# Patient Record
Sex: MALE | Race: WHITE | NOT HISPANIC OR LATINO | URBAN - METROPOLITAN AREA
[De-identification: names, ages, dates, MRNs, and addresses within clinical notes are randomized per-mention and may not be internally consistent; named-entity substitution may affect disease eponyms.]

---

## 2017-01-09 ENCOUNTER — OFFICE VISIT (OUTPATIENT)
Dept: FAMILY MEDICINE | Facility: CLINIC | Age: 62
End: 2017-01-09

## 2017-01-09 VITALS
OXYGEN SATURATION: 99 % | WEIGHT: 149.4 LBS | DIASTOLIC BLOOD PRESSURE: 77 MMHG | HEART RATE: 56 BPM | TEMPERATURE: 98.1 F | SYSTOLIC BLOOD PRESSURE: 122 MMHG | BODY MASS INDEX: 25.63 KG/M2

## 2017-01-09 DIAGNOSIS — D50.0 IRON DEFICIENCY ANEMIA DUE TO CHRONIC BLOOD LOSS: Primary | ICD-10-CM

## 2017-01-09 DIAGNOSIS — F41.9 ANXIETY: ICD-10-CM

## 2017-01-09 DIAGNOSIS — R09.89 RUNNY NOSE: ICD-10-CM

## 2017-01-09 DIAGNOSIS — G47.00 INSOMNIA, UNSPECIFIED TYPE: ICD-10-CM

## 2017-01-09 DIAGNOSIS — J30.9 ALLERGIC RHINITIS, UNSPECIFIED ALLERGIC RHINITIS TRIGGER, UNSPECIFIED RHINITIS SEASONALITY: ICD-10-CM

## 2017-01-09 LAB
% GRANULOCYTES: 69.4 %G (ref 40–75)
GRANULOCYTES #: 4.5 K/UL (ref 1.6–8.3)
HCT VFR BLD AUTO: 37.7 % (ref 40–53)
HEMOGLOBIN: 11.4 G/DL (ref 13.3–17.7)
INR PPP: 2
LYMPHOCYTES # BLD AUTO: 1.5 K/UL (ref 0.8–5.3)
LYMPHOCYTES NFR BLD AUTO: 23.5 %L (ref 20–48)
MCH RBC QN AUTO: 25.9 PG (ref 26.5–35)
MCHC RBC AUTO-ENTMCNC: 30.2 G/DL (ref 32–36)
MCV RBC AUTO: 85.6 FL (ref 78–100)
MID #: 0.5 K/UL (ref 0–2.2)
MID %: 7.1 %M (ref 0–20)
PLATELET # BLD AUTO: 350 K/UL (ref 150–450)
RBC # BLD AUTO: 4.4 M/UL (ref 4.4–5.9)
WBC # BLD AUTO: 6.5 K/UL (ref 4–11)

## 2017-01-09 RX ORDER — FLUTICASONE PROPIONATE 50 MCG
1 SPRAY, SUSPENSION (ML) NASAL DAILY
Qty: 1 BOTTLE | Refills: 11 | Status: SHIPPED | OUTPATIENT
Start: 2017-01-09

## 2017-01-09 RX ORDER — GABAPENTIN 400 MG/1
CAPSULE ORAL
Qty: 0.1 CAPSULE | Refills: 0 | COMMUNITY
Start: 2017-01-09

## 2017-01-09 RX ORDER — OXYMETAZOLINE HYDROCHLORIDE 0.05 G/100ML
1 SPRAY NASAL 2 TIMES DAILY
Qty: 15 ML | Refills: 0 | Status: SHIPPED | OUTPATIENT
Start: 2017-01-09 | End: 2017-01-14

## 2017-01-09 RX ORDER — TRAZODONE HYDROCHLORIDE 100 MG/1
TABLET ORAL
Qty: 0.1 TABLET | Refills: 0 | COMMUNITY
Start: 2017-01-09

## 2017-01-09 NOTE — Clinical Note
January 9, 2017      Brayan Jackson  [address]  [place] [address]        Dear Brayan,    Please see below for your test results.  Jamshid Schmidt - your hemoglobin has improved a lot but is still below normal.  I would go ahead and take one iron pill twice a day for one more month and then can cut back to once a day.  We'll check your hemoglobin again in about a month - and will expect it to be closer to normal then, take care,      Resulted Orders   CBC with Diff Plt ([facility])   Result Value Ref Range    WBC 6.5 4.0 - 11.0 K/uL    Lymphocytes # 1.5 0.8 - 5.3 K/uL    % Lymphocytes 23.5 20.0 - 48.0 %L    Mid # 0.5 0.0 - 2.2 K/uL    Mid % 7.1 0.0 - 20.0 %M    GRANULOCYTES # 4.5 1.6 - 8.3 K/uL    % Granulocytes 69.4 40.0 - 75.0 %G    RBC 4.4 4.4 - 5.9 M/uL    Hemoglobin 11.4 (L) 13.3 - 17.7 g/dL    Hematocrit 37.7 (L) 40.0 - 53.0 %    MCV 85.6 78.0 - 100.0 fL    MCH 25.9 (L) 26.5 - 35.0    MCHC 30.2 (L) 32.0 - 36.0 g/dL    Platelets 350.0 150.0 - 450.0 K/uL       If you have any questions, please call the clinic to make an appointment.    Sincerely,    Suresh Winn MD

## 2017-01-09 NOTE — MR AVS SNAPSHOT
After Visit Summary   1/9/2017    Brayan Jackson    MRN: [id number]           Patient Information     Date Of Birth          1955        Visit Information        Provider Department      1/9/2017 10:00 AM Suresh Winn MD [facility]        Today's Diagnoses     Iron deficiency anemia due to chronic blood loss    -  1     Runny nose         Allergic rhinitis, unspecified allergic rhinitis trigger, unspecified rhinitis seasonality           Care Instructions    Diagnosis PULM EMBOLISM    INR Goal 2.0-3.0    Last Weeks Dose 3.5mg daily    Patient Instructions: Continue 3.5mg daily    Recheck INR In: 2 WEEKS    INR assessment THER    INR Location Clinic    Comments Not using pill box. Provided to pt.      Take oxymetazoline (afrin) nose spray for 5 days then stop.    Start fluticasone nose spray every day after 5 days and continue it to prevent nose running.          Follow-ups after your visit        Who to contact     Please call your clinic at [phone] to:    Ask questions about your health    Make or cancel appointments    Discuss your medicines    Learn about your test results    Speak to your doctor   If you have compliments or concerns about an experience at your clinic, or if you wish to file a complaint, please contact [facility] Patient Relations at [phone] or email us at [email or web address]         Additional Information About Your Visit        [email or web address] Information     EdCourage is an electronic gateway that provides easy, online access to your medical records. With EdCourage, you can request a clinic appointment, read your test results, renew a prescription or communicate with your care team.     To sign up for [email or web address] visit the website at [email or web address]   You will be asked to enter the access code listed below, as well as some personal information. Please follow the directions to create your username and password.     Your  access code is: [email or web address]  Expires: 2017 10:28 AM     Your access code will  in 90 days. If you need help or a new code, please contact your [facility] or call [phone] for assistance.        Care EveryWhere ID     This is your Care EveryWhere ID. This could be used by other organizations to access your [place] medical records  [id number]        Your Vitals Were     Pulse Temperature Pulse Oximetry             56 98.1  F (36.7  C) (Oral) 99%          Blood Pressure from Last 3 Encounters:   17 122/77   16 94/60   16 96/62    Weight from Last 3 Encounters:   17 149 lb 6.4 oz (67.767 kg)   16 154 lb 9.6 oz (70.126 kg)   16 142 lb (64.411 kg)              We Performed the Following     CBC with Diff Plt ([facility])     INR ([facility])          Today's Medication Changes          These changes are accurate as of: 17 10:28 AM.  If you have any questions, ask your nurse or doctor.               Start taking these medicines.        Dose/Directions    fluticasone 50 MCG/ACT spray   Commonly known as:  FLONASE   Used for:  Allergic rhinitis, unspecified allergic rhinitis trigger, unspecified rhinitis seasonality   Started by:  Suresh Winn MD        Dose:  1 spray   Spray 1 spray into both nostrils daily   Quantity:  1 Bottle   Refills:  11       oxymetazoline 0.05 % spray   Commonly known as:  AFRIN NASAL SPRAY   Used for:  Runny nose   Started by:  Suresh Winn MD        Dose:  1 spray   Spray 1 spray into both nostrils 2 times daily for 5 days   Quantity:  15 mL   Refills:  0         Stop taking these medicines if you haven't already. Please contact your care team if you have questions.     hydrOXYzine 50 MG capsule   Commonly known as:  VISTARIL   Stopped by:  Suresh Winn MD                Where to get your medicines      Some of these will need a paper prescription and others can be bought over the counter.  Ask your nurse if you  have questions.     Bring a paper prescription for each of these medications    - fluticasone 50 MCG/ACT spray  - oxymetazoline 0.05 % spray             Primary Care Provider Office Phone # Fax #    Suresh Winn [phone]       [address] [address]        Thank you!     Thank you for choosing [facility]  for your care. Our goal is always to provide you with excellent care. Hearing back from our patients is one way we can continue to improve our services. Please take a few minutes to complete the written survey that you may receive in the mail after your visit with us. Thank you!             Your Updated Medication List - Protect others around you: Learn how to safely use, store and throw away your medicines at www.disposemymeds.org.          This list is accurate as of: 1/9/17 10:28 AM.  Always use your most recent med list.                   Brand Name Dispense Instructions for use    B-12 1000 MCG Tbcr     30 tablet    Take 1,000 mcg by mouth daily       busPIRone 5 MG tablet    BUSPAR    90 tablet    Take 1 tablet (5 mg) by mouth 3 times daily       ferrous fumarate 324 (106 FE) MG Tabs tablet    FERRETTS    60 each    Take 1 tablet (324 mg) by mouth daily If tolerated, increase to twice daily after 1 week       ferrous gluconate 324 (38 FE) MG tablet    FERGON    100 tablet    Take 1 tablet (324 mg) by mouth 2 times daily       ferrous sulfate 325 (65 FE) MG tablet    IRON    90 tablet    Take 1 tablet (325 mg) by mouth 3 times daily (with meals)       fluticasone 50 MCG/ACT spray    FLONASE    1 Bottle    Spray 1 spray into both nostrils daily       gabapentin 400 MG capsule    NEURONTIN    0.1 capsule    Please ask Dr Alonso to refill this to avoid mistakes       LORazepam 1 MG tablet    ATIVAN    20 tablet    Take 1 tablet (1 mg) by mouth 3 times daily as needed for anxiety       oxymetazoline 0.05 % spray    AFRIN NASAL SPRAY    15 mL    Spray 1 spray  into both nostrils 2 times daily for 5 days       risperiDONE 4 MG tablet    risperDAL     Take 1 tablet (4 mg) by mouth At Bedtime       traZODone 100 MG tablet    DESYREL    0.1 tablet    Please ask Dr Alonso to refill this to avoid mistakes       * warfarin 1 MG tablet    COUMADIN    30 tablet    Take 1 tablet (1 mg) by mouth daily With 2.5mg tablet for total dose of 3.5mg       * warfarin 2.5 MG tablet    COUMADIN    30 tablet    Take 1 tablet (2.5 mg) by mouth daily With 1mg tablet for total dose of 3.5mg/day       * Notice:  This list has 2 medication(s) that are the same as other medications prescribed for you. Read the directions carefully, and ask your doctor or other care provider to review them with you.

## 2017-01-09 NOTE — PATIENT INSTRUCTIONS
Diagnosis PULM EMBOLISM    INR Goal 2.0-3.0    Last Weeks Dose 3.5mg daily    Patient Instructions: Continue 3.5mg daily    Recheck INR In: 2 WEEKS    INR assessment THER    INR Location Clinic    Comments Not using pill box. Provided to pt.      Take oxymetazoline (afrin) nose spray for 5 days then stop.    Start fluticasone nose spray every day after 5 days and continue it to prevent nose running.

## 2017-01-09 NOTE — PROGRESS NOTES
SUBJECTIVE:  Brayan is a 61-year-old who is well-known to me.  He attends with one-month history of runny nose.  He seems distressed by this.  He says that it occurs particularly when he is eating and is frustrating for that reason.  He doesn't notice it so much when he is in bed or at other times.  He acknowledges that cold weather may worsen it.  He doesn't believe he has other allergies.  I noted an asthma diagnosis on file, but he says that he hasn't had any problems with this for years and he hasn't used an inhaler for a long time; therefore, we agreed to remove this from his problem list.  His ACT score today confirms that he has no asthma symptoms.   Otherwise, he feels that he is doing well.  He lives independently.  He attends a psychiatrist and he  can't say for sure all of his current medications.  He believes he is emotionally stable at this time and is managing things well.   OBJECTIVE:   Vital signs are noted and are satisfactory.  Please see the attached note by pharmacy that reviews his INR.  They are also attempting to verify his medication list.  ENT exam reveals nasal congestion, but no other significant abnormality is visible in the nostrils.  His TMs aren't clearly seen due to cerumen.  His mouth appears clear of abnormality.  He has implants in both upper and lower jaws.  He has no neck adenopathy.  No goiter.  Lungs are clear to auscultation.  Heart sounds are normal without murmur.  His pulse is regular.   ASSESSMENT:   1.  Complaint of runny nose.    2.  Past history of allergic rhinitis.   3.  Anticoagulation, satisfactory.   4.  Mental health satisfactory per patient.   PLAN:  It is unclear if his symptoms reflect viral URI vs allergy vs other causes of rhinitis.  I suggested that we use oxymetazoline for symptom relief over the next five days and then transition over to fluticasone nasal spray.  He struggled a bit to understand the sequence of these medications but I believe he did so by  the end of the visit.  We'll attempt to reconcile his medications.  We asked him to return in one months' time with al of his medications so we can clarify that our list is correct.  We'll check hemoglobin today and I'll notify him of the results.  At the time of dictation, hemoglobin came back mildly reduced; therefore, we'll encourage him to continue taking iron b.i.d. until he has returned to normal hemoglobin levels.   We'll plan to see him again in 1-2 months or sooner p.r.n.   Total visit time was 25 minutes and all of this was face-to-face MD time.  Over 50% was spent in counseling and coordination of care.

## 2017-01-09 NOTE — PROGRESS NOTES
"MED REC    Pt does not know meds so I called [facility] pharmacy and reconciled meds over the phone.  There were 3 iron preps on his list, and I confirmed with the pharmacy that the gluconate was the most recent filled, and confirmed with the pt that he is taking the green iron (gluconate).  D/C'ed the other 2 iron preps off the list.  Updated meds prescribed by Dr. Alonso and inserted doses.  Pt no longer taking hydroxyzine so removed it from the list.    INR Questionnaire    1) Dose of warfarin: 3.5mg/d (he knows only by color- one light green tab [2.5mg] plus one bright pink tab [1mg])  2) Strengths of warfarin at home: 2.5mg, 1mg  3) Missed doses in last week: No (\"I don't think so\" [but pt does not use a pill box]  4) Extra doses in last week: No  5) More or less salads/green vegetables/broccoli in last 2 weeks: No  6) Started or stopped any other medications in last 3 weeks: No  7) In last 1-2 weeks, any problems with bleeding: No  8) Need a refill of warfarin? No  9) What phone number is best: as above  10) Is it OK to leave a detailed message with instructions? Yes    *He does not use a pill box and takes his meds out of bottles.  I recommended that he use a pill box and provided one to him.*    INR: 2.0  Goal INR: 2.0-3.0  New dose: Continue 3.5mg daily  Recheck INR: 2 wk  Informed patient: in clinic    Nely Santos, PharmD    "

## 2017-01-10 ASSESSMENT — ASTHMA QUESTIONNAIRES: ACT_TOTALSCORE: 25

## 2017-01-10 ASSESSMENT — PATIENT HEALTH QUESTIONNAIRE - PHQ9: SUM OF ALL RESPONSES TO PHQ QUESTIONS 1-9: 1

## 2017-01-19 ENCOUNTER — DOCUMENTATION ONLY (OUTPATIENT)
Dept: FAMILY MEDICINE | Facility: CLINIC | Age: 62
End: 2017-01-19

## 2017-01-19 DIAGNOSIS — D50.0 IRON DEFICIENCY ANEMIA DUE TO CHRONIC BLOOD LOSS: ICD-10-CM

## 2017-01-19 LAB
% GRANULOCYTES: 74 %G (ref 40–75)
GRANULOCYTES #: 6.6 K/UL (ref 1.6–8.3)
HCT VFR BLD AUTO: 41.3 % (ref 40–53)
HEMOGLOBIN: 12.6 G/DL (ref 13.3–17.7)
INR PPP: 2.3
LYMPHOCYTES # BLD AUTO: 1.7 K/UL (ref 0.8–5.3)
LYMPHOCYTES NFR BLD AUTO: 19.3 %L (ref 20–48)
MCH RBC QN AUTO: 26.2 PG (ref 26.5–35)
MCHC RBC AUTO-ENTMCNC: 30.5 G/DL (ref 32–36)
MCV RBC AUTO: 85.9 FL (ref 78–100)
MID #: 0.6 K/UL (ref 0–2.2)
MID %: 6.7 %M (ref 0–20)
PLATELET # BLD AUTO: 387 K/UL (ref 150–450)
RBC # BLD AUTO: 4.8 M/UL (ref 4.4–5.9)
WBC # BLD AUTO: 8.9 K/UL (ref 4–11)

## 2017-01-19 NOTE — PROGRESS NOTES
INR Questionnaire    1) Dose of warfarin: 3.5 mg daily  2) Strengths of warfarin at home: 1 mg and 2.5 mg tablets  3) Missed doses in last week: No  4) Extra doses in last week: No  5) More or less salads/green vegetables/broccoli in last 2 weeks: No  6) Started or stopped any other medications in last 3 weeks: No  7) In last 1-2 weeks, any problems with bleeding: No  8) Need a refill of warfarin? No  9) What phone number is best: [phone]  10) Is it OK to leave a detailed message with instructions? Yes    Diagnosis PULM EMBOLISM    INR Goal 2.0-3.0    Last Weeks Dose 3.5 mg daily    Patient Instructions: Continue same dose    Recheck INR In: 2 WEEKS    INR assessment THER    INR Location Clinic    Comments Patient is using pill box      INR: 2.3  Goal INR: 2.0-3.0  New dose: continue same dose  Recheck INR: 2 WEEKS  Informed patient: Yes    Fabienne Harmon, PharmD Student      I have reviewed and verified the student s documentation and found it to be correct and complete.  Ritika Tidwell, Pharm.D. Resident

## 2017-01-19 NOTE — Clinical Note
January 19, 2017      Brayan Jackson  [address]   [place] [address]        Dear Brayan,    Please see below for your test results.    Resulted Orders   CBC with Diff Plt ([facility])   Result Value Ref Range    WBC 8.9 4.0 - 11.0 K/uL    Lymphocytes # 1.7 0.8 - 5.3 K/uL    % Lymphocytes 19.3 (L) 20.0 - 48.0 %L    Mid # 0.6 0.0 - 2.2 K/uL    Mid % 6.7 0.0 - 20.0 %M    GRANULOCYTES # 6.6 1.6 - 8.3 K/uL    % Granulocytes 74.0 40.0 - 75.0 %G    RBC 4.8 4.4 - 5.9 M/uL    Hemoglobin 12.6 (L) 13.3 - 17.7 g/dL    Hematocrit 41.3 40.0 - 53.0 %    MCV 85.9 78.0 - 100.0 fL    MCH 26.2 (L) 26.5 - 35.0    MCHC 30.5 (L) 32.0 - 36.0 g/dL    Platelets 387.0 150.0 - 450.0 K/uL   Brayan your hemoglobin is getting better and coming  to the normal range.  Should be able to stop iron in a month - please schedule an appointment with me for about a month's time to discuss.  Dr ROGER Winn

## 2017-01-20 NOTE — PROGRESS NOTES
Quick Note:    Reviewed INR result from 1/19/2017, which was discussed with patient by pharm student and resident. Ritika Saravia MD  ______

## 2017-01-20 NOTE — PROGRESS NOTES
Patient s case reviewed. I agree with the written assessment and plan of care.    Janae Valerio, PharmD.

## 2017-01-26 ENCOUNTER — OFFICE VISIT (OUTPATIENT)
Dept: FAMILY MEDICINE | Facility: CLINIC | Age: 62
End: 2017-01-26

## 2017-01-26 ENCOUNTER — TELEPHONE (OUTPATIENT)
Dept: FAMILY MEDICINE | Facility: CLINIC | Age: 62
End: 2017-01-26

## 2017-01-26 VITALS — HEART RATE: 79 BPM | SYSTOLIC BLOOD PRESSURE: 111 MMHG | TEMPERATURE: 97.9 F | DIASTOLIC BLOOD PRESSURE: 71 MMHG

## 2017-01-26 DIAGNOSIS — D51.9 ANEMIA DUE TO VITAMIN B12 DEFICIENCY, UNSPECIFIED B12 DEFICIENCY TYPE: ICD-10-CM

## 2017-01-26 DIAGNOSIS — M62.82 NON-TRAUMATIC RHABDOMYOLYSIS: ICD-10-CM

## 2017-01-26 DIAGNOSIS — M62.89 STIFF MUSCLES: Primary | ICD-10-CM

## 2017-01-26 DIAGNOSIS — R53.83 FATIGUE, UNSPECIFIED TYPE: ICD-10-CM

## 2017-01-26 DIAGNOSIS — I26.99 OTHER ACUTE PULMONARY EMBOLISM WITHOUT ACUTE COR PULMONALE (H): ICD-10-CM

## 2017-01-26 LAB
% GRANULOCYTES: 85.5 %G (ref 40–75)
BUN SERPL-MCNC: 13.5 MG/DL (ref 7–21)
CALCIUM SERPL-MCNC: 9.2 MG/DL (ref 8.5–10.1)
CHLORIDE SERPLBLD-SCNC: 100.4 MMOL/L (ref 98–110)
CK SERPL-CCNC: 1149 U/L (ref 30–190)
CO2 SERPL-SCNC: 27.5 MMOL/L (ref 20–32)
CREAT SERPL-MCNC: 1.2 MG/DL (ref 0.7–1.3)
GFR SERPL CREATININE-BSD FRML MDRD: 65.2 ML/MIN/1.7 M2
GLUCOSE SERPL-MCNC: 127.7 MG'DL (ref 70–99)
GRANULOCYTES #: 9.8 K/UL (ref 1.6–8.3)
HCT VFR BLD AUTO: 38.5 % (ref 40–53)
HEMOGLOBIN: 11.9 G/DL (ref 13.3–17.7)
INR PPP: 2.83 (ref 0.9–1.1)
LACTATE SERPL-SCNC: 1.5 MMOL/L (ref 0.5–2.2)
LYMPHOCYTES # BLD AUTO: 1.2 K/UL (ref 0.8–5.3)
LYMPHOCYTES NFR BLD AUTO: 10.6 %L (ref 20–48)
MCH RBC QN AUTO: 27 PG (ref 26.5–35)
MCHC RBC AUTO-ENTMCNC: 30.9 G/DL (ref 32–36)
MCV RBC AUTO: 87.4 FL (ref 78–100)
MID #: 0.4 K/UL (ref 0–2.2)
MID %: 3.9 %M (ref 0–20)
PLATELET # BLD AUTO: 329 K/UL (ref 150–450)
POTASSIUM SERPL-SCNC: 3.1 MMOL/DL (ref 3.2–4.6)
RBC # BLD AUTO: 4.4 M/UL (ref 4.4–5.9)
SODIUM SERPL-SCNC: 136.4 MMOL/L (ref 132–142)
TSH SERPL DL<=0.05 MIU/L-ACNC: 1.81 UIU/ML (ref 0.3–5)
WBC # BLD AUTO: 11.5 K/UL (ref 4–11)

## 2017-01-26 NOTE — PATIENT INSTRUCTIONS
Thank you for coming to [facility].  **If you had lab testing today and your results are reassuring or normal they will be be mailed to you within 7 days.   **If the lab tests need quick action we will call you with the results.  The phone number we will call with results is # [phone] (home) . If this is not the best number please call our clinic and change the number.  If you need any refills please call your pharmacy and they will contact us.  If you have any concerns about today's visit or wish to schedule another appointment please call our office during normal business hours [phone] (8-5:00 M-F)  If you have urgent medical concerns please call [phone] at any time of the day.  If you a medical emergency please call [phone]  Again thank you for choosing [facility] and please let us know how we can best partner with you to improve you and your family's health.

## 2017-01-26 NOTE — TELEPHONE ENCOUNTER
[facility] phone call message- general phone call:    Reason for call: Would like to speak with Dr. Winn or his nurse in regards to a patient transfer request.     Return call needed: Yes    OK to leave a message on voice mail? Yes    Primary language: English      needed? No    Call taken on January 26, 2017 at 4:13 PM by Bhaskar Dean

## 2017-01-26 NOTE — PROGRESS NOTES
SUBJECTIVE       Brayan Jackson is a 61 year old  male with a PMHx significant for:     Patient Active Problem List   Diagnosis     Allergic rhinitis     Generalized anxiety disorder     Other cataract     Major depressive disorder with psychotic features (H)     Hyperlipidemia     Microscopic hematuria     Iron deficiency anemia     B12 deficiency anemia     Other disorders of plasma protein metabolism     Personal history of malignant neoplasm of bladder     Statin medication not prescribed per physician orders     Pulmonary embolism (H)     Syncope, unspecified syncope type     Right leg DVT (H)     Here today for fatigue, dizziness and back pain. He states his symptoms started about a week ago, when he opened the window in his house for several hours for relief of his nasal congestion. He states his lower back has a sharp pain that hurts most when rotating. He has shooting sensations down both legs. He has had no recent fevers / chills or incontince. He denies recent falls or injuries while lifting objects.     Mr. Jackson also feels dizziness, which he describes as if the room is spinning. He states this feeling started around the same time as his back pain, and is present both when stationary and walking. His dizziness is worse when going from sitting to standing. He also is experiencing a headache, although this is similar to his dizziness.    He also has been feeling significantly more tired than usual, for the past week, and has less muscle strength and energy. He states he has muscle spasms throughout his body, as well as trembling.     Mr Jackson denies any recent changes to his medications.     Mr. Jackson thinks that further evaluation of his symptoms in the ED is reasonable, however he cannot drive to the ED due to his dizziness and overall condition.    He was in the ED on 1/21 for epistaxis, his INR at that time was 2.37.    PMH, Medications and Allergies were reviewed and updated as  needed.          OBJECTIVE     Filed Vitals:    01/26/17 1336   BP: 111/71   Pulse: 79   Temp: 97.9  F (36.6  C)   TempSrc: Oral     There is no weight on file to calculate BMI.    Gen:  Dishelved man with flat affect, slowed movement, and difficulty making eye contact  HEENT: mucous membranes moist, PERRL, EOMI  Neck: supple   CV:  RRR  - no murmurs, rubs, or gallups  Pulm:  CTAB, no wheezes/rales/rhonchi  Extrem: No lower extremity edema  Neuro: Exam limited due to patient effort. No focal neurologic deficits on CN exam or strength exam. Shuffling gait. Difficult raising out of chair.   Psych: Flat affect, psychomotor slowing      Results for orders placed or performed in visit on 01/26/17 (from the past 24 hour(s))   Basic Metabolic Panel ([facility])   Result Value Ref Range    Urea Nitrogen 13.5 7.0 - 21.0 mg/dL    Calcium 9.2 8.5 - 10.1 mg/dL    Chloride 100.4 98.0 - 110.0 mmol/L    Carbon Dioxide 27.5 20.0 - 32.0 mmol/L    Creatinine 1.2 0.7 - 1.3 mg/dL    Glucose 127.7 (H) 70.0 - 99.0 mg'dL    Potassium 3.1 (L) 3.2 - 4.6 mmol/dL    Sodium 136.4 132.0 - 142.0 mmol/L    GFR Estimate 65.2 mL/min/1.7 m2    GFR Estimate If Black 78.9 mL/min/1.7 m2   Lactic Acid ([facility])   Result Value Ref Range    Lactic Acid 1.5 0.5 - 2.2 mmol/L    Narrative    Test performed by:  [facility]  [address] [place] [address]   Thyroid [place] ([facility])   Result Value Ref Range    TSH 1.81 0.30 - 5.00 uIU/mL    Narrative    Test performed by:  [facility]  [address] [place] [address]   INR ([facility])   Result Value Ref Range    INR 2.83 (H) 0.90 - 1.10    Narrative    Test performed by:  [facility] [address] [place] [address]  INR Therapeutic Ranges:  Mech. Valve 2.5-3.5  Post Surg.  2.0-3.0  DVT/PE      2.0-3.0   CK Total ([facility])   Result Value Ref Range    CK, Total 1149 () 30 - 190 U/L    Narrative    Test performed by:  [facility]  [address] [place] [address]   CBC with Diff Plt ([facility])   Result Value Ref Range    WBC 11.5 (H) 4.0 - 11.0 K/uL    Lymphocytes # 1.2 0.8 - 5.3 K/uL    % Lymphocytes 10.6 (L) 20.0 - 48.0 %L    Mid # 0.4 0.0 - 2.2 K/uL    Mid % 3.9 0.0 - 20.0 %M    GRANULOCYTES # 9.8 (H) 1.6 - 8.3 K/uL    % Granulocytes 85.5 (H) 40.0 - 75.0 %G    RBC 4.4 4.4 - 5.9 M/uL    Hemoglobin 11.9 (L) 13.3 - 17.7 g/dL    Hematocrit 38.5 (L) 40.0 - 53.0 %    MCV 87.4 78.0 - 100.0 fL    MCH 27.0 26.5 - 35.0    MCHC 30.9 (L) 32.0 - 36.0 g/dL    Platelets 329.0 150.0 - 450.0 K/uL   Manual Diff ([facility])   Result Value Ref Range    Platelet Estimate Normal Normal    Ovalocytes 1+ (A) Negative    Target Cells 1+ (A) Negative    Narrative    Test performed by:  [facility] [address] [place] [address]   CBC w/ Diff and Plt ([facility])   Result Value Ref Range    WBC 10.6 4.0 - 11.0 thou/uL    RBC 4.31 (L) 4.40 - 6.20 mill/uL    Hemoglobin 11.8 (L) 14.0 - 18.0 g/dL    Hematocrit 37.4 (L) 40.0 - 54.0 %    MCV 87 80 - 100 fL    MCH 27.4 27.0 - 34.0 pg    MCHC 31.6 (L) 32.0 - 36.0 g/dL    RDW 20.9 (H) 11.0 - 14.5 %    Platelets 349 140 - 440 thou/uL    Mean Platelet Volume 10.9 8.5 - 12.5 fL    % Neutrophils 80 (H) 50 - 70 %    % Lymphocytes 13 (L) 20 - 40 %    % Monocytes 6 2 - 10 %    % Eosinophils 1 0 - 6 %    % Basophils 1 0 - 2 %    Neutrophils (Absolute) 8.4 (H) 2.0 - 7.7 thou/uL    Lymphs (Absolute) 1.4 0.8 - 4.4 thou/uL    Monocytes(Absolute) 0.6 0.0 - 0.9 thou/uL    Eos (Absolute) 0.1 0.0 - 0.4 thou/uL    Baso (Absolute) 0.1 0.0 - 0.2 thou/uL    Narrative    Test [facility] by:  [facility]  [address] [place] [address]       ASSESSMENT AND PLAN     Brayan was seen today for dizziness, back pain and fatigue.     Unclear etiology of his symptoms, however patient is obviously greatly diminished from his baseline. The differential diagnosis is broad, and includes mental health causes,  intracranial bleed, medication SE (including neuroleptic malignant syndrome 2/2 risperidone), electrolyte abnormalities, carbon monoxide poisoning, infection, and pulmonary embolism. Due to his dizziness, difficulty ambulating, confusion, and unclear diagnosis, the patient was transferred to [facility] ED for further evaluation, including a head CT. If a physical cause of his condition is ruled out, his symptoms are most likely due to a mental health etiology.    Anemia due to vitamin B12 deficiency, unspecified B12 deficiency type  -     CBC with Diff Plt ([facility])    Stiff muscles  -     Lactic Acid ([facility])  -     CK Total ([facility])    Fatigue, unspecified type  -     Basic Metabolic Panel ([place])  -     Thyroid [place] ([facility])  -     CBC with Diff Plt ([facility])    Other acute pulmonary embolism without acute cor pulmonale (H)  -     INR ([facility])    Encephalopathy       -     Likely multifactorial, refer to ED    Sandoval Harvey, MS4 acted as a scribe and the encounter documented above was performed completely by me.    Total visit time was 40 mins, all of which was face to face MD time, and over 50% of this time was spent in counseling and coordination of care.  Patient's condition did not improve over the course of time hence the referral to ED from where he was admitted with encephalopathy.     The medical student acted as a scribe and the encounter documented above was performed completely by me.    Suresh Winn MD MPH        Suresh Winn MD MPH

## 2017-01-27 ENCOUNTER — TELEPHONE (OUTPATIENT)
Dept: FAMILY MEDICINE | Facility: CLINIC | Age: 62
End: 2017-01-27

## 2017-01-27 ENCOUNTER — TRANSFERRED RECORDS (OUTPATIENT)
Dept: HEALTH INFORMATION MANAGEMENT | Facility: CLINIC | Age: 62
End: 2017-01-27

## 2017-01-27 LAB
BASOPHILS # BLD AUTO: 0.1 THOU/UL (ref 0–0.2)
BASOPHILS NFR BLD AUTO: 1 % (ref 0–2)
EOSINOPHIL # BLD AUTO: 0.1 THOU/UL (ref 0–0.4)
EOSINOPHIL NFR BLD AUTO: 1 % (ref 0–6)
ERYTHROCYTE [DISTWIDTH] IN BLOOD BY AUTOMATED COUNT: 20.9 % (ref 11–14.5)
HCT VFR BLD AUTO: 37.4 % (ref 40–54)
HGB BLD-MCNC: 11.8 G/DL (ref 14–18)
LYMPHOCYTES # BLD AUTO: 1.4 THOU/UL (ref 0.8–4.4)
LYMPHOCYTES NFR BLD AUTO: 13 % (ref 20–40)
MCH RBC QN AUTO: 27.4 PG (ref 27–34)
MCHC RBC AUTO-ENTMCNC: 31.6 G/DL (ref 32–36)
MCV RBC AUTO: 87 FL (ref 80–100)
MONOCYTES # BLD AUTO: 0.6 THOU/UL (ref 0–0.9)
MONOCYTES NFR BLD AUTO: 6 % (ref 2–10)
NEUTROPHILS # BLD AUTO: 8.4 THOU/UL (ref 2–7.7)
NEUTROPHILS NFR BLD AUTO: 80 % (ref 50–70)
OVALOCYTES BLD QL SMEAR: ABNORMAL
PLATELET # BLD AUTO: 349 THOU/UL (ref 140–440)
PLATELET BLD QL SMEAR: NORMAL
PMV BLD AUTO: 10.9 FL (ref 8.5–12.5)
RBC # BLD AUTO: 4.31 MILL/UL (ref 4.4–6.2)
TARGETS BLD QL SMEAR: ABNORMAL
WBC # BLD AUTO: 10.6 THOU/UL (ref 4–11)

## 2017-01-27 NOTE — TELEPHONE ENCOUNTER
[facility] phone call message- general phone call:    Reason for call: They are just wondering if Dr Winn would follow this patient by phone and fax.    Return call needed: Yes    OK to leave a message on voice mail? Yes    Primary language: English      needed? No    Call taken on January 27, 2017 at 4:00 PM by Suraj Morrell

## 2017-01-27 NOTE — TELEPHONE ENCOUNTER
Yes, he is / was still hospitalized but if being DCed to here, happy to continue to follow - D Power

## 2017-01-30 NOTE — TELEPHONE ENCOUNTER
I think there's a misunderstanding here - I AM quite happy to continue to follow Brayan.    At the time I had gotten the original message, he was still an inpt at [facility] - that's what I'd been referring to - OK?  thanks

## 2017-01-30 NOTE — TELEPHONE ENCOUNTER
Called [facility] Mcgu[facility] (TCU) the MD at this TCU does not take [facility] patients.     Told [facility] Mcguire, if pt is going to TCU after hospital stay Dr. Winn will not follow until he is discharged from TCU. TCU MD can provide care until he is discharged. [facility]     Routed to Dr. Winn (Dr. Winn, let me know if you DO want to follow this pt at a TCU)    /SHWETHA Medellin

## 2017-01-30 NOTE — TELEPHONE ENCOUNTER
PS He actually is still at [facility] - that's why these messages have been confusing!

## 2017-02-02 ENCOUNTER — TELEPHONE (OUTPATIENT)
Dept: FAMILY MEDICINE | Facility: CLINIC | Age: 62
End: 2017-02-02

## 2017-06-10 ENCOUNTER — TRANSFERRED RECORDS (OUTPATIENT)
Dept: HEALTH INFORMATION MANAGEMENT | Facility: CLINIC | Age: 62
End: 2017-06-10

## 2017-06-10 ASSESSMENT — MIFFLIN-ST. JEOR
SCORE: 1419.03

## 2017-06-11 ENCOUNTER — SURGERY - HEALTHEAST (OUTPATIENT)
Dept: GASTROENTEROLOGY | Facility: CLINIC | Age: 62
End: 2017-06-11

## 2017-06-13 ENCOUNTER — SURGERY - HEALTHEAST (OUTPATIENT)
Dept: GASTROENTEROLOGY | Facility: CLINIC | Age: 62
End: 2017-06-13

## 2017-06-13 ASSESSMENT — MIFFLIN-ST. JEOR
SCORE: 1335.63

## 2017-06-14 ASSESSMENT — MIFFLIN-ST. JEOR
SCORE: 1336.47

## 2017-06-15 ASSESSMENT — MIFFLIN-ST. JEOR
SCORE: 1298.63

## 2017-06-16 ENCOUNTER — HOME CARE/HOSPICE - HEALTHEAST (OUTPATIENT)
Dept: HOME HEALTH SERVICES | Facility: HOME HEALTH | Age: 62
End: 2017-06-16

## 2017-06-16 ASSESSMENT — MIFFLIN-ST. JEOR
SCORE: 1449.87

## 2017-06-17 ENCOUNTER — HOME CARE/HOSPICE - HEALTHEAST (OUTPATIENT)
Dept: HOME HEALTH SERVICES | Facility: HOME HEALTH | Age: 62
End: 2017-06-17

## 2017-06-18 ENCOUNTER — HOME CARE/HOSPICE - HEALTHEAST (OUTPATIENT)
Dept: HOME HEALTH SERVICES | Facility: HOME HEALTH | Age: 62
End: 2017-06-18

## 2017-06-21 ENCOUNTER — HOME CARE/HOSPICE - HEALTHEAST (OUTPATIENT)
Dept: HOME HEALTH SERVICES | Facility: HOME HEALTH | Age: 62
End: 2017-06-21

## 2017-06-23 ENCOUNTER — HOME CARE/HOSPICE - HEALTHEAST (OUTPATIENT)
Dept: HOME HEALTH SERVICES | Facility: HOME HEALTH | Age: 62
End: 2017-06-23

## 2017-06-24 ENCOUNTER — HOME CARE/HOSPICE - HEALTHEAST (OUTPATIENT)
Dept: HOME HEALTH SERVICES | Facility: HOME HEALTH | Age: 62
End: 2017-06-24

## 2017-06-27 ENCOUNTER — HOME CARE/HOSPICE - HEALTHEAST (OUTPATIENT)
Dept: HOME HEALTH SERVICES | Facility: HOME HEALTH | Age: 62
End: 2017-06-27

## 2017-06-27 ENCOUNTER — TRANSFERRED RECORDS (OUTPATIENT)
Dept: HEALTH INFORMATION MANAGEMENT | Facility: CLINIC | Age: 62
End: 2017-06-27

## 2017-07-01 ENCOUNTER — HOME CARE/HOSPICE - HEALTHEAST (OUTPATIENT)
Dept: HOME HEALTH SERVICES | Facility: HOME HEALTH | Age: 62
End: 2017-07-01

## 2017-07-01 ENCOUNTER — TELEPHONE (OUTPATIENT)
Dept: FAMILY MEDICINE | Facility: CLINIC | Age: 62
End: 2017-07-01

## 2017-07-01 NOTE — TELEPHONE ENCOUNTER
Received an answering service page from home health on Mr. Jackson.  His INR today was 1.9, tonight will be his 4th dose of coumadin.  Still on lovenox (has 3 injections left).  Advised 5mg coumadin tonight and tomorrow and then rechecking his INR Monday.  Verbal orders also provided for home health therapy.    Discussed with Dr. Zacarias.    Emil Quintero,   PGY2 [facility]

## 2017-07-03 ENCOUNTER — TELEPHONE (OUTPATIENT)
Dept: FAMILY MEDICINE | Facility: CLINIC | Age: 62
End: 2017-07-03

## 2017-07-03 ENCOUNTER — HOME CARE/HOSPICE - HEALTHEAST (OUTPATIENT)
Dept: HOME HEALTH SERVICES | Facility: HOME HEALTH | Age: 62
End: 2017-07-03

## 2017-07-03 LAB — INR PPP: 3.1

## 2017-07-03 NOTE — TELEPHONE ENCOUNTER
INR Questionnaire    ** Call from nurse Lisbeth at [facility]: [phone].  Recent d/c from [facility] on 6/30.  Complicated: new DVT; history of DVT in opposite leg and PE, as well as recent GI bleed.  Recent use of Xarelo; after GI bleed Hematology had recommended d/c anticoagulation.  Now on warfarin for life.    1) Dose of warfarin: 10mg x1 on 6/30; 5mg on 7/1; 5mg on 7/2.  Also was on enoxaparin- took last dose this AM.  INRs on 7.1 were 1.4 and 1.9.  2) Strengths of warfarin at home: 5mg  3) Missed doses in last week: No  4) Extra doses in last week: No  5) In last 1-2 weeks, any problems with bleeding: No      INR: 3.1  Goal INR: 2.0-3.0  New dose: 2.5mg x 1 then 5mg x 1 then recheck INR on 7/5.  D/C enoxaparin  Recheck INR: 2 days  Informed patient: informed [facility] ([phone])    B/C of complicated pt with recent DVT, past DVT, PE, cancer, and recent serious GI bleed; discussed with Dr. Sousa, who agreed with the plan.    Diagnosis DVT PE   INR Goal 2.0-3.0    Last Weeks Dose 10mg x 1, then 5mg x 2    Patient Instructions: 2.5mg x 1 then 5mg x 1 then check INR    Recheck INR In: 2 DAYS    INR assessment SUPRA    INR Location [facility]    Comments Recent hospitalization for DVT- d/c 6/30- see note        Nely Santos, PharmD

## 2017-07-05 ENCOUNTER — TELEPHONE (OUTPATIENT)
Dept: FAMILY MEDICINE | Facility: CLINIC | Age: 62
End: 2017-07-05

## 2017-07-05 ENCOUNTER — HOME CARE/HOSPICE - HEALTHEAST (OUTPATIENT)
Dept: HOME HEALTH SERVICES | Facility: HOME HEALTH | Age: 62
End: 2017-07-05

## 2017-07-05 NOTE — TELEPHONE ENCOUNTER
Date of discharge: 06/30/2017  Facility of discharge: [facility]  Patient concerns about condition: No concerns at this time.  Patient concerns about medications: No concerns at this time.  Full med reconciliation will be completed at clinic visit.  Patient concerns about transitioning: No concerns at this time.  Clinic office visit appointment date: PT states he has no concerns but is unable to get to the clinic will call to schedule when he has ride  Patient reminded to bring all medications (prescription and over-the-counter) to clinic appointment: Yes

## 2017-07-06 ENCOUNTER — HOME CARE/HOSPICE - HEALTHEAST (OUTPATIENT)
Dept: HOME HEALTH SERVICES | Facility: HOME HEALTH | Age: 62
End: 2017-07-06

## 2017-07-07 ENCOUNTER — HOME CARE/HOSPICE - HEALTHEAST (OUTPATIENT)
Dept: HOME HEALTH SERVICES | Facility: HOME HEALTH | Age: 62
End: 2017-07-07

## 2017-07-08 ENCOUNTER — HOME CARE/HOSPICE - HEALTHEAST (OUTPATIENT)
Dept: HOME HEALTH SERVICES | Facility: HOME HEALTH | Age: 62
End: 2017-07-08

## 2017-07-10 ENCOUNTER — HOME CARE/HOSPICE - HEALTHEAST (OUTPATIENT)
Dept: HOME HEALTH SERVICES | Facility: HOME HEALTH | Age: 62
End: 2017-07-10

## 2017-07-10 ENCOUNTER — COMMUNICATION - HEALTHEAST (OUTPATIENT)
Dept: HOME HEALTH SERVICES | Facility: HOME HEALTH | Age: 62
End: 2017-07-10

## 2017-07-10 ENCOUNTER — AMBULATORY - HEALTHEAST (OUTPATIENT)
Dept: FAMILY MEDICINE | Facility: CLINIC | Age: 62
End: 2017-07-10

## 2017-07-10 DIAGNOSIS — Z86.711 HISTORY OF PULMONARY EMBOLISM: ICD-10-CM

## 2017-07-10 DIAGNOSIS — R53.81 PHYSICAL DECONDITIONING: ICD-10-CM

## 2017-07-11 ENCOUNTER — HOME CARE/HOSPICE - HEALTHEAST (OUTPATIENT)
Dept: HOME HEALTH SERVICES | Facility: HOME HEALTH | Age: 62
End: 2017-07-11

## 2017-07-12 ENCOUNTER — HOME CARE/HOSPICE - HEALTHEAST (OUTPATIENT)
Dept: HOME HEALTH SERVICES | Facility: HOME HEALTH | Age: 62
End: 2017-07-12

## 2017-07-13 ENCOUNTER — HOME CARE/HOSPICE - HEALTHEAST (OUTPATIENT)
Dept: HOME HEALTH SERVICES | Facility: HOME HEALTH | Age: 62
End: 2017-07-13

## 2017-07-14 ENCOUNTER — HOME CARE/HOSPICE - HEALTHEAST (OUTPATIENT)
Dept: HOME HEALTH SERVICES | Facility: HOME HEALTH | Age: 62
End: 2017-07-14

## 2017-07-15 ENCOUNTER — HOME CARE/HOSPICE - HEALTHEAST (OUTPATIENT)
Dept: HOME HEALTH SERVICES | Facility: HOME HEALTH | Age: 62
End: 2017-07-15

## 2017-07-17 ENCOUNTER — HOME CARE/HOSPICE - HEALTHEAST (OUTPATIENT)
Dept: HOME HEALTH SERVICES | Facility: HOME HEALTH | Age: 62
End: 2017-07-17

## 2017-07-19 ENCOUNTER — HOME CARE/HOSPICE - HEALTHEAST (OUTPATIENT)
Dept: HOME HEALTH SERVICES | Facility: HOME HEALTH | Age: 62
End: 2017-07-19

## 2017-07-20 ENCOUNTER — HOME CARE/HOSPICE - HEALTHEAST (OUTPATIENT)
Dept: HOME HEALTH SERVICES | Facility: HOME HEALTH | Age: 62
End: 2017-07-20

## 2017-07-21 ENCOUNTER — HOME CARE/HOSPICE - HEALTHEAST (OUTPATIENT)
Dept: HOME HEALTH SERVICES | Facility: HOME HEALTH | Age: 62
End: 2017-07-21

## 2017-07-24 ENCOUNTER — HOME CARE/HOSPICE - HEALTHEAST (OUTPATIENT)
Dept: HOME HEALTH SERVICES | Facility: HOME HEALTH | Age: 62
End: 2017-07-24

## 2017-07-25 ENCOUNTER — HOME CARE/HOSPICE - HEALTHEAST (OUTPATIENT)
Dept: HOME HEALTH SERVICES | Facility: HOME HEALTH | Age: 62
End: 2017-07-25

## 2017-07-26 ENCOUNTER — HOME CARE/HOSPICE - HEALTHEAST (OUTPATIENT)
Dept: HOME HEALTH SERVICES | Facility: HOME HEALTH | Age: 62
End: 2017-07-26

## 2017-07-27 ENCOUNTER — HOME CARE/HOSPICE - HEALTHEAST (OUTPATIENT)
Dept: HOME HEALTH SERVICES | Facility: HOME HEALTH | Age: 62
End: 2017-07-27

## 2017-07-28 ENCOUNTER — HOME CARE/HOSPICE - HEALTHEAST (OUTPATIENT)
Dept: HOME HEALTH SERVICES | Facility: HOME HEALTH | Age: 62
End: 2017-07-28

## 2017-08-02 ENCOUNTER — HOME CARE/HOSPICE - HEALTHEAST (OUTPATIENT)
Dept: HOME HEALTH SERVICES | Facility: HOME HEALTH | Age: 62
End: 2017-08-02

## 2017-08-21 ENCOUNTER — TRANSFERRED RECORDS (OUTPATIENT)
Dept: HEALTH INFORMATION MANAGEMENT | Facility: CLINIC | Age: 62
End: 2017-08-21

## 2018-01-15 ENCOUNTER — RECORDS - HEALTHEAST (OUTPATIENT)
Dept: LAB | Facility: CLINIC | Age: 63
End: 2018-01-15

## 2018-01-16 LAB — INR PPP: 2.61 (ref 0.9–1.1)

## 2018-02-05 ENCOUNTER — RECORDS - HEALTHEAST (OUTPATIENT)
Dept: LAB | Facility: CLINIC | Age: 63
End: 2018-02-05

## 2018-02-06 LAB — INR PPP: 3.36 (ref 0.9–1.1)

## 2018-02-20 ENCOUNTER — RECORDS - HEALTHEAST (OUTPATIENT)
Dept: LAB | Facility: CLINIC | Age: 63
End: 2018-02-20

## 2018-02-21 LAB — INR PPP: 2.18 (ref 0.9–1.1)

## 2018-02-27 ENCOUNTER — RECORDS - HEALTHEAST (OUTPATIENT)
Dept: LAB | Facility: CLINIC | Age: 63
End: 2018-02-27

## 2018-02-28 LAB — INR PPP: 2.55 (ref 0.9–1.1)

## 2018-03-02 ENCOUNTER — RECORDS - HEALTHEAST (OUTPATIENT)
Dept: LAB | Facility: CLINIC | Age: 63
End: 2018-03-02

## 2018-03-02 LAB
ALBUMIN SERPL-MCNC: 2.9 G/DL (ref 3.5–5)
ALP SERPL-CCNC: 119 U/L (ref 45–120)
ALT SERPL W P-5'-P-CCNC: 10 U/L (ref 0–45)
ANION GAP SERPL CALCULATED.3IONS-SCNC: 8 MMOL/L (ref 5–18)
AST SERPL W P-5'-P-CCNC: 19 U/L (ref 0–40)
BILIRUB SERPL-MCNC: 0.3 MG/DL (ref 0–1)
BUN SERPL-MCNC: 9 MG/DL (ref 8–22)
CALCIUM SERPL-MCNC: 8.7 MG/DL (ref 8.5–10.5)
CHLORIDE BLD-SCNC: 106 MMOL/L (ref 98–107)
CO2 SERPL-SCNC: 28 MMOL/L (ref 22–31)
CREAT SERPL-MCNC: 0.89 MG/DL (ref 0.7–1.3)
ERYTHROCYTE [DISTWIDTH] IN BLOOD BY AUTOMATED COUNT: 12.4 % (ref 11–14.5)
GFR SERPL CREATININE-BSD FRML MDRD: >60 ML/MIN/1.73M2
GLUCOSE BLD-MCNC: 122 MG/DL (ref 70–125)
HCT VFR BLD AUTO: 39.5 % (ref 40–54)
HGB BLD-MCNC: 12.9 G/DL (ref 14–18)
MAGNESIUM SERPL-MCNC: 1.9 MG/DL (ref 1.8–2.6)
MCH RBC QN AUTO: 32 PG (ref 27–34)
MCHC RBC AUTO-ENTMCNC: 32.7 G/DL (ref 32–36)
MCV RBC AUTO: 98 FL (ref 80–100)
PLATELET # BLD AUTO: 261 THOU/UL (ref 140–440)
PMV BLD AUTO: 10.3 FL (ref 8.5–12.5)
POTASSIUM BLD-SCNC: 3.7 MMOL/L (ref 3.5–5)
PROT SERPL-MCNC: 5.9 G/DL (ref 6–8)
RBC # BLD AUTO: 4.03 MILL/UL (ref 4.4–6.2)
SODIUM SERPL-SCNC: 142 MMOL/L (ref 136–145)
TSH SERPL DL<=0.005 MIU/L-ACNC: 2.21 UIU/ML (ref 0.3–5)
WBC: 7.9 THOU/UL (ref 4–11)

## 2018-03-13 ENCOUNTER — RECORDS - HEALTHEAST (OUTPATIENT)
Dept: LAB | Facility: CLINIC | Age: 63
End: 2018-03-13

## 2018-03-14 LAB — INR PPP: 2.74 (ref 0.9–1.1)

## 2018-04-03 ENCOUNTER — RECORDS - HEALTHEAST (OUTPATIENT)
Dept: LAB | Facility: CLINIC | Age: 63
End: 2018-04-03

## 2018-04-04 LAB — INR PPP: 2.62 (ref 0.9–1.1)

## 2018-05-02 ENCOUNTER — RECORDS - HEALTHEAST (OUTPATIENT)
Dept: LAB | Facility: CLINIC | Age: 63
End: 2018-05-02

## 2018-05-03 LAB — INR PPP: 1.26 (ref 0.9–1.1)

## 2018-05-08 ENCOUNTER — RECORDS - HEALTHEAST (OUTPATIENT)
Dept: LAB | Facility: CLINIC | Age: 63
End: 2018-05-08

## 2018-05-09 LAB — INR PPP: 2.12 (ref 0.9–1.1)

## 2018-05-16 ENCOUNTER — RECORDS - HEALTHEAST (OUTPATIENT)
Dept: LAB | Facility: CLINIC | Age: 63
End: 2018-05-16

## 2018-05-16 LAB — INR PPP: 2.8 (ref 0.9–1.1)

## 2018-05-24 ENCOUNTER — RECORDS - HEALTHEAST (OUTPATIENT)
Dept: LAB | Facility: CLINIC | Age: 63
End: 2018-05-24

## 2018-05-24 LAB — INR PPP: 4.49 (ref 0.9–1.1)

## 2018-05-31 ENCOUNTER — RECORDS - HEALTHEAST (OUTPATIENT)
Dept: LAB | Facility: CLINIC | Age: 63
End: 2018-05-31

## 2018-05-31 LAB — INR PPP: 2.72 (ref 0.9–1.1)

## 2018-06-06 ENCOUNTER — RECORDS - HEALTHEAST (OUTPATIENT)
Dept: LAB | Facility: CLINIC | Age: 63
End: 2018-06-06

## 2018-06-07 LAB — INR PPP: 3.93 (ref 0.9–1.1)

## 2018-06-13 ENCOUNTER — RECORDS - HEALTHEAST (OUTPATIENT)
Dept: LAB | Facility: CLINIC | Age: 63
End: 2018-06-13

## 2018-06-14 LAB — INR PPP: 2.25 (ref 0.9–1.1)

## 2018-06-19 ENCOUNTER — RECORDS - HEALTHEAST (OUTPATIENT)
Dept: LAB | Facility: CLINIC | Age: 63
End: 2018-06-19

## 2018-06-20 LAB — INR PPP: 2.69 (ref 0.9–1.1)

## 2018-06-29 ENCOUNTER — RECORDS - HEALTHEAST (OUTPATIENT)
Dept: LAB | Facility: CLINIC | Age: 63
End: 2018-06-29

## 2018-06-29 LAB — INR PPP: 2.69 (ref 0.9–1.1)

## 2018-07-13 ENCOUNTER — RECORDS - HEALTHEAST (OUTPATIENT)
Dept: LAB | Facility: CLINIC | Age: 63
End: 2018-07-13

## 2018-07-13 LAB — INR PPP: 2.51 (ref 0.9–1.1)

## 2018-07-31 ENCOUNTER — RECORDS - HEALTHEAST (OUTPATIENT)
Dept: LAB | Facility: CLINIC | Age: 63
End: 2018-07-31

## 2018-07-31 LAB — INR PPP: 2.01 (ref 0.9–1.1)

## 2018-08-22 ENCOUNTER — RECORDS - HEALTHEAST (OUTPATIENT)
Dept: LAB | Facility: CLINIC | Age: 63
End: 2018-08-22

## 2018-08-22 LAB — INR PPP: 1.02 (ref 0.9–1.1)

## 2018-08-27 ENCOUNTER — RECORDS - HEALTHEAST (OUTPATIENT)
Dept: LAB | Facility: CLINIC | Age: 63
End: 2018-08-27

## 2018-08-27 LAB — INR PPP: 1.32 (ref 0.9–1.1)

## 2018-08-31 ENCOUNTER — RECORDS - HEALTHEAST (OUTPATIENT)
Dept: LAB | Facility: CLINIC | Age: 63
End: 2018-08-31

## 2018-09-04 LAB — INR PPP: 1.21 (ref 0.9–1.1)

## 2018-09-10 ENCOUNTER — RECORDS - HEALTHEAST (OUTPATIENT)
Dept: LAB | Facility: CLINIC | Age: 63
End: 2018-09-10

## 2018-09-10 LAB — INR PPP: 1.39 (ref 0.9–1.1)

## 2018-09-11 ENCOUNTER — RECORDS - HEALTHEAST (OUTPATIENT)
Dept: LAB | Facility: CLINIC | Age: 63
End: 2018-09-11

## 2018-09-11 LAB — INR PPP: 1.89 (ref 0.9–1.1)

## 2018-09-12 ENCOUNTER — RECORDS - HEALTHEAST (OUTPATIENT)
Dept: LAB | Facility: CLINIC | Age: 63
End: 2018-09-12

## 2018-09-12 LAB — INR PPP: 2.05 (ref 0.9–1.1)

## 2018-09-18 ENCOUNTER — RECORDS - HEALTHEAST (OUTPATIENT)
Dept: LAB | Facility: CLINIC | Age: 63
End: 2018-09-18

## 2018-09-18 LAB
C DIFF TOX B STL QL: NEGATIVE
RIBOTYPE 027/NAP1/BI: NORMAL

## 2018-09-19 LAB
SHIGA TOXIN 1: NEGATIVE
SHIGA TOXIN 2: NEGATIVE

## 2018-09-20 ENCOUNTER — RECORDS - HEALTHEAST (OUTPATIENT)
Dept: LAB | Facility: CLINIC | Age: 63
End: 2018-09-20

## 2018-09-20 LAB — INR PPP: 3.04 (ref 0.9–1.1)

## 2018-09-21 LAB — BACTERIA SPEC CULT: NORMAL

## 2018-09-24 ENCOUNTER — RECORDS - HEALTHEAST (OUTPATIENT)
Dept: LAB | Facility: CLINIC | Age: 63
End: 2018-09-24

## 2018-09-25 LAB
ANION GAP SERPL CALCULATED.3IONS-SCNC: 7 MMOL/L (ref 5–18)
BUN SERPL-MCNC: 12 MG/DL (ref 8–22)
CALCIUM SERPL-MCNC: 9.6 MG/DL (ref 8.5–10.5)
CHLORIDE BLD-SCNC: 104 MMOL/L (ref 98–107)
CO2 SERPL-SCNC: 27 MMOL/L (ref 22–31)
CREAT SERPL-MCNC: 1.06 MG/DL (ref 0.7–1.3)
ERYTHROCYTE [DISTWIDTH] IN BLOOD BY AUTOMATED COUNT: 13.6 % (ref 11–14.5)
GFR SERPL CREATININE-BSD FRML MDRD: >60 ML/MIN/1.73M2
GLUCOSE BLD-MCNC: 76 MG/DL (ref 70–125)
HCT VFR BLD AUTO: 41.2 % (ref 40–54)
HGB BLD-MCNC: 13.4 G/DL (ref 14–18)
MCH RBC QN AUTO: 32.1 PG (ref 27–34)
MCHC RBC AUTO-ENTMCNC: 32.5 G/DL (ref 32–36)
MCV RBC AUTO: 99 FL (ref 80–100)
PLATELET # BLD AUTO: 445 THOU/UL (ref 140–440)
PMV BLD AUTO: 9.6 FL (ref 8.5–12.5)
POTASSIUM BLD-SCNC: 4.8 MMOL/L (ref 3.5–5)
RBC # BLD AUTO: 4.18 MILL/UL (ref 4.4–6.2)
SODIUM SERPL-SCNC: 138 MMOL/L (ref 136–145)
WBC: 6.2 THOU/UL (ref 4–11)

## 2018-09-26 ENCOUNTER — RECORDS - HEALTHEAST (OUTPATIENT)
Dept: LAB | Facility: CLINIC | Age: 63
End: 2018-09-26

## 2018-09-27 LAB — INR PPP: 2.53 (ref 0.9–1.1)

## 2018-10-04 ENCOUNTER — RECORDS - HEALTHEAST (OUTPATIENT)
Dept: LAB | Facility: CLINIC | Age: 63
End: 2018-10-04

## 2018-10-04 LAB — INR PPP: 4.11 (ref 0.9–1.1)

## 2018-10-05 ENCOUNTER — RECORDS - HEALTHEAST (OUTPATIENT)
Dept: LAB | Facility: CLINIC | Age: 63
End: 2018-10-05

## 2018-10-08 LAB — INR PPP: 1.86 (ref 0.9–1.1)

## 2018-10-17 ENCOUNTER — RECORDS - HEALTHEAST (OUTPATIENT)
Dept: LAB | Facility: CLINIC | Age: 63
End: 2018-10-17

## 2018-10-17 LAB — INR PPP: 3.1 (ref 0.9–1.1)

## 2018-10-24 ENCOUNTER — RECORDS - HEALTHEAST (OUTPATIENT)
Dept: LAB | Facility: CLINIC | Age: 63
End: 2018-10-24

## 2018-10-24 LAB — INR PPP: 0.95 (ref 0.9–1.1)

## 2018-10-31 ENCOUNTER — RECORDS - HEALTHEAST (OUTPATIENT)
Dept: LAB | Facility: CLINIC | Age: 63
End: 2018-10-31

## 2018-10-31 LAB — INR PPP: 1.51 (ref 0.9–1.1)

## 2018-11-07 ENCOUNTER — RECORDS - HEALTHEAST (OUTPATIENT)
Dept: LAB | Facility: CLINIC | Age: 63
End: 2018-11-07

## 2018-11-08 LAB — INR PPP: 3.26 (ref 0.9–1.1)

## 2018-11-15 ENCOUNTER — RECORDS - HEALTHEAST (OUTPATIENT)
Dept: LAB | Facility: CLINIC | Age: 63
End: 2018-11-15

## 2018-11-15 LAB — INR PPP: 2.69 (ref 0.9–1.1)

## 2018-11-21 ENCOUNTER — RECORDS - HEALTHEAST (OUTPATIENT)
Dept: LAB | Facility: CLINIC | Age: 63
End: 2018-11-21

## 2018-11-21 LAB — INR PPP: 2.25 (ref 0.9–1.1)

## 2018-12-05 ENCOUNTER — RECORDS - HEALTHEAST (OUTPATIENT)
Dept: LAB | Facility: CLINIC | Age: 63
End: 2018-12-05

## 2018-12-05 LAB — INR PPP: 2.61 (ref 0.9–1.1)

## 2018-12-24 ENCOUNTER — RECORDS - HEALTHEAST (OUTPATIENT)
Dept: LAB | Facility: CLINIC | Age: 63
End: 2018-12-24

## 2018-12-26 LAB — INR PPP: 2.82 (ref 0.9–1.1)

## 2019-01-01 ENCOUNTER — RECORDS - HEALTHEAST (OUTPATIENT)
Dept: LAB | Facility: CLINIC | Age: 64
End: 2019-01-01

## 2019-01-01 LAB
ANION GAP SERPL CALCULATED.3IONS-SCNC: 11 MMOL/L (ref 5–18)
BUN SERPL-MCNC: 6 MG/DL (ref 8–22)
CALCIUM SERPL-MCNC: 9.6 MG/DL (ref 8.5–10.5)
CHLORIDE BLD-SCNC: 103 MMOL/L (ref 98–107)
CO2 SERPL-SCNC: 24 MMOL/L (ref 22–31)
CREAT SERPL-MCNC: 0.94 MG/DL (ref 0.7–1.3)
ERYTHROCYTE [DISTWIDTH] IN BLOOD BY AUTOMATED COUNT: 13.1 % (ref 11–14.5)
GFR SERPL CREATININE-BSD FRML MDRD: >60 ML/MIN/1.73M2
GLUCOSE BLD-MCNC: 85 MG/DL (ref 70–125)
HCT VFR BLD AUTO: 41.2 % (ref 40–54)
HGB BLD-MCNC: 13.5 G/DL (ref 14–18)
INR PPP: 1.57 (ref 0.9–1.1)
INR PPP: 1.95 (ref 0.9–1.1)
INR PPP: 2.07 (ref 0.9–1.1)
INR PPP: 2.24 (ref 0.9–1.1)
INR PPP: 3.53 (ref 0.9–1.1)
MCH RBC QN AUTO: 33.2 PG (ref 27–34)
MCHC RBC AUTO-ENTMCNC: 32.8 G/DL (ref 32–36)
MCV RBC AUTO: 101 FL (ref 80–100)
PLATELET # BLD AUTO: 322 THOU/UL (ref 140–440)
PMV BLD AUTO: 9.4 FL (ref 8.5–12.5)
POTASSIUM BLD-SCNC: 4.3 MMOL/L (ref 3.5–5)
RBC # BLD AUTO: 4.07 MILL/UL (ref 4.4–6.2)
SODIUM SERPL-SCNC: 138 MMOL/L (ref 136–145)
VIT B12 SERPL-MCNC: 1258 PG/ML (ref 213–816)
WBC: 7.6 THOU/UL (ref 4–11)

## 2019-01-22 ENCOUNTER — RECORDS - HEALTHEAST (OUTPATIENT)
Dept: LAB | Facility: CLINIC | Age: 64
End: 2019-01-22

## 2019-01-23 LAB — INR PPP: 4.02 (ref 0.9–1.1)

## 2019-01-25 ENCOUNTER — RECORDS - HEALTHEAST (OUTPATIENT)
Dept: LAB | Facility: CLINIC | Age: 64
End: 2019-01-25

## 2019-01-25 LAB — INR PPP: 2.54 (ref 0.9–1.1)

## 2019-01-30 ENCOUNTER — RECORDS - HEALTHEAST (OUTPATIENT)
Dept: LAB | Facility: CLINIC | Age: 64
End: 2019-01-30

## 2019-02-01 LAB — INR PPP: 1.96 (ref 0.9–1.1)

## 2019-02-13 ENCOUNTER — RECORDS - HEALTHEAST (OUTPATIENT)
Dept: LAB | Facility: CLINIC | Age: 64
End: 2019-02-13

## 2019-02-13 LAB — INR PPP: 2.21 (ref 0.9–1.1)

## 2019-03-05 ENCOUNTER — RECORDS - HEALTHEAST (OUTPATIENT)
Dept: LAB | Facility: CLINIC | Age: 64
End: 2019-03-05

## 2019-03-06 LAB — INR PPP: 2.1 (ref 0.9–1.1)

## 2019-03-13 ENCOUNTER — RECORDS - HEALTHEAST (OUTPATIENT)
Dept: LAB | Facility: CLINIC | Age: 64
End: 2019-03-13

## 2019-03-13 LAB — INR PPP: 0.93 (ref 0.9–1.1)

## 2019-03-29 ENCOUNTER — RECORDS - HEALTHEAST (OUTPATIENT)
Dept: LAB | Facility: CLINIC | Age: 64
End: 2019-03-29

## 2019-03-29 LAB — INR PPP: 2.22 (ref 0.9–1.1)

## 2020-01-01 ENCOUNTER — OFFICE VISIT - HEALTHEAST (OUTPATIENT)
Dept: RADIATION ONCOLOGY | Facility: HOSPITAL | Age: 65
End: 2020-01-01

## 2020-01-01 ENCOUNTER — HOME CARE/HOSPICE - HEALTHEAST (OUTPATIENT)
Dept: HOSPICE | Facility: HOSPICE | Age: 65
End: 2020-01-01

## 2020-01-01 ENCOUNTER — AMBULATORY - HEALTHEAST (OUTPATIENT)
Dept: OTHER | Facility: CLINIC | Age: 65
End: 2020-01-01

## 2020-01-01 ENCOUNTER — AMBULATORY - HEALTHEAST (OUTPATIENT)
Dept: RADIATION ONCOLOGY | Facility: HOSPITAL | Age: 65
End: 2020-01-01

## 2020-01-01 ENCOUNTER — HOME CARE/HOSPICE - HEALTHEAST (OUTPATIENT)
Dept: HOME HEALTH SERVICES | Facility: HOME HEALTH | Age: 65
End: 2020-01-01

## 2020-01-01 ENCOUNTER — DOCUMENTATION ONLY (OUTPATIENT)
Dept: OTHER | Facility: CLINIC | Age: 65
End: 2020-01-01

## 2020-01-01 ENCOUNTER — COMMUNICATION - HEALTHEAST (OUTPATIENT)
Dept: RADIATION ONCOLOGY | Facility: CLINIC | Age: 65
End: 2020-01-01

## 2020-01-01 ENCOUNTER — RECORDS - HEALTHEAST (OUTPATIENT)
Dept: ADMINISTRATIVE | Facility: OTHER | Age: 65
End: 2020-01-01

## 2020-01-01 DIAGNOSIS — C78.00 MALIGNANT NEOPLASM METASTATIC TO LUNG, UNSPECIFIED LATERALITY (H): ICD-10-CM

## 2020-01-01 DIAGNOSIS — C79.51 BONE METASTASIS: ICD-10-CM

## 2021-05-31 VITALS
WEIGHT: 156.8 LBS | HEIGHT: 67 IN | HEIGHT: 67 IN | WEIGHT: 156.8 LBS | BODY MASS INDEX: 24.61 KG/M2 | BODY MASS INDEX: 24.61 KG/M2 | HEIGHT: 67 IN | WEIGHT: 156.8 LBS | BODY MASS INDEX: 24.61 KG/M2

## 2021-06-01 ENCOUNTER — RECORDS - HEALTHEAST (OUTPATIENT)
Dept: ADMINISTRATIVE | Facility: CLINIC | Age: 66
End: 2021-06-01

## 2021-06-05 NOTE — CONSULTS
Consults   [facility] Radiation Oncology Consult Note     Patient: Brayan Jackson  MRN: [id number]  Date of Service: 02/03/2020          Jaime Lopez MD  [address] [place] [address]       Dear Dr. Lopez:    Thank you very much for referring this patient for consideration of radiotherapy. As you know Mr. Jackson is a 64 y.o. male with a diagnosis of stage IV non-small cell lung cancer with clinically symptomatic pelvic bone metastasis.  The patient is referred to radiation oncology for evaluation and consideration of possible palliative radiation therapy for local control and symptom relief.    HISTORY OF PRESENT ILLNESS:   Mr. Jackson is a 64 y.o. male who had a history of right lower lobe and left lower lobe nodule since 2015.  Patient has been followed closely at [facility].  Staging PET CT scan in 2018 reviewed FDG avid left lower lobe nodule and right lower lobe nodule suspicious for malignancy.  Patient underwent CT-guided biopsy 8/21/2018 which showed well differentiated adenocarcinoma with mucinous differentiation.  His case has been reviewed at tumor conference in [facility] and recommendation is to consider wedge resection for the right lower lobe lesion and SBRT for the left lower lobe lesion.  Patient underwent a wedge resection 9/6/2018 with pathology showed well-differentiated adenocarcinoma with negative margin.  Multiple removed lymph nodes showed no evidence of metastatic disease.  Patient also received SBRT for his left lower lobe tumor at [facility] and I do not have a copy of report at the time of evaluation.  He has been followed closely with Dr. Jaime Lopez, medical oncology at [place] [facility] since then.  Patient presented with recent history of worsening pelvic pain primarily on the left side for which he was a seeking further evaluation.  PET CT scan on 1/31/2020 showed progression of disease with development of left pleural effusion and multiple bone metastasis including right SI joint and  the left hip and pelvic region.  He also had a ultrasound-guided thoracentesis with pathology showed management pleural effusion with metastatic adenocarcinoma. Patient has been admitted to the hospital for ongoing supportive care.  He is now ranking his pain at 7/10 scale with pain medication.  Patient is referred to radiation oncology for evaluation and consideration of possible palliative radiation therapy for local control and symptom relief.    CHEMOTHERAPY HISTORY: Concurrent Chemotherapy: No    RADIATION THERAPY HISTORY: Prior Radiation: Yes    IMPLANTED CARDIAC DEVICE: none     Past Medical History:   Diagnosis Date     Acute respiratory failure with hypoxia (H)      Allergic rhinitis      Anemia      Anxiety      Asthma      Bilateral pulmonary embolism (H) 11/13/2016     Bladder cancer (H) 2010    Followed by Dr. Duffy of [facility] urology.     Deep vein thrombosis (DVT) of right lower extremity (H) 11/07/2016    superficial femoral vein, popliteal vein, posterior tibial veins, and peroneal veins     Overdose 09/03/2016    amitriptyline, mirtazapine and perphenazine     PONV (postoperative nausea and vomiting)      Prediabetes      Severe major depression with psychotic features (H)      Tricyclic overdose 9/3/2016     Vitamin B12 deficiency      Past Surgical History:   Procedure Laterality Date     ESOPHAGOGASTRODUODENOSCOPY N/A 6/11/2017    Procedure: ESOPHAGOGASTRODUODENOSCOPY (EGD);  Surgeon: Bridgette Marlow MD;  Location: [facility];  Service:      ESOPHAGOGASTRODUODENOSCOPY N/A 6/11/2017    Procedure: ESOPHAGOGASTRODUODENOSCOPY (EGD) WITH INJECTION OF NS:EPINEPHRINE;  Surgeon: Bridgette Marlow MD;  Location: [facility];  Service:      ESOPHAGOGASTRODUODENOSCOPY N/A 6/13/2017    Procedure: ESOPHAGOGASTRODUODENOSCOPY (EGD) with biopsies;  Surgeon: Bridgette Marlow MD;  Location: [facility];  Service:      PICC  6/11/2017          SC CYSTOURETHROSCOPY,FULGUR <0.5 CM  DANIELLE N/A 6/4/2014    Procedure: Cystoscopy;  Surgeon: Melchor Duffy MD;  Location[facility];  Service: Urology     RETINAL DETACHMENT SURGERY        THORACENTESIS  1/20/2020      THORACENTESIS  1/29/2020     Dicloxacillin sodium; Latex; Latex, natural rubber; and Penicillins  Family History   Problem Relation Age of Onset     No Medical Problems Mother      No Medical Problems Father      Hypertension Neg Hx      Heart disease Neg Hx      Stroke Neg Hx      Diabetes Neg Hx      Clotting disorder Neg Hx      Social History     Socioeconomic History     Marital status: Single     Spouse name: Not on file     Number of children: Not on file     Years of education: Not on file     Highest education level: Not on file   Occupational History     Occupation: Disability   Social Needs     Financial resource strain: Not on file     Food insecurity:     Worry: Not on file     Inability: Not on file     Transportation needs:     Medical: Not on file     Non-medical: Not on file   Tobacco Use     Smoking status: Never Smoker   Substance and Sexual Activity     Alcohol use: Not Currently     Drug use: No     Sexual activity: Not on file   Lifestyle     Physical activity:     Days per week: Not on file     Minutes per session: Not on file     Stress: Not on file   Relationships     Social connections:     Talks on phone: Not on file     Gets together: Not on file     Attends [employer] service: Not on file     Active member of club or organization: Not on file     Attends meetings of clubs or organizations: Not on file     Relationship status: Not on file     Intimate partner violence:     Fear of current or ex partner: Not on file     Emotionally abused: Not on file     Physically abused: Not on file     Forced sexual activity: Not on file   Other Topics Concern     Not on file   Social History Narrative    6/27/17: Lives at [facility]    1/19/2020: Lives in assisted living.         Imaging: Reviewed    Pathology: Reviewed    Objective:     PHYSICAL EXAMINATION:    There were no vitals taken for this visit.    Gen: Alert, in NAD  Eyes: PERRL, EOMI, sclera anicteric  HENT     Head: NC/AT     Ears: No external auricular lesions     Nose/sinus: No rhinorrhea or epistaxis     Oropharynx: MMM, no visible oral lesions  Neck: Supple, full ROM, no LAD  Pulm: No wheezing, stridor or respiratory distress  CV: Well-perfused, no cyanosis, no pedal edema  Abdominal: BS+, soft, nontender, nondistended, no hepatomegaly  Back: No step-offs, there is a moderate tenderness involving the left iliac bone and hip joint consistent with patient history of bone metastasis.  Rectal: Deferred  : Deferred  Musculoskeletal: Normal muscle bulk and tone  Skin: Normal color and turgor  Neurologic: A/Ox3, CN II-XII intact, normal gait and station  Psychiatric: Appropriate mood and affect    Intent of Therapy: Palliative  Side effects that may occur during or within weeks after Radiation Therapy      Fatigue and general weakness    Irritation or soreness of the irradiated area    Loss of hair on the irradiated area      Side effects that may occur months or years after Radiation Therapy      Development of another tumor or cancer    Weakening of the bone            Osteoradionecrosis    Bone fracture    Poor healing after a trauma or surgery in the irradiated area    Nerve damage resulting in loss of strength and sensation    The risks, benefits and alternatives to radiation therapy were outlined with the patient. All questions were answered and a consent was signed.     Impression     Stage IV non-small cell lung cancer with clinically symptomatic pelvic bone metastasis.  The patient is referred to radiation oncology for evaluation and consideration of possible palliative radiation therapy for local control and symptom relief.    Assessment & Plan:     I have personally reviewed his upcoming medical record today.  I  have also reviewed his most recent radiology study including PET CT scan.  This is a 64-year-old gentleman with a diagnosis of stage IV non-small cell lung cancer with clinically symptomatic pelvic bone metastasis.  The possible treatment options including surgery, systemic therapy, and radiation therapy has been discussed with the patient in detail and at great lengths.  The possible risks and side effects of radiation therapy has also been explained to the patient.  I agree the patient is a good candidate for considering a short course palliative radiation therapy to the pelvic region.  I believe the patient can be potentially benefited by radiation therapy for local control and symptom relief.  The pros and cons of different options has also been discussed with patient.  After long discussion, the patient elected to proceed with palliative radiation therapy as his treatment choice being aware of potential risks and side effects involved.  He is scheduled to return to radiation oncology later on for simulation.  I plan to give him radiation therapy to a total dose of 3000 cGy in 10 treatments targeted to the pelvic region only.  Patient is also informed that usually palliative radiation therapy is an outpatient procedure and patient can be discharged home in the near future if he is determined to be safe and stable.  Patient will continue his long-term follow-up and ongoing care for his lung cancer with Dr. Jaime Lopez, medical oncology.    Again, thank you very much for the referral and allowing me to participate in the care of this patient.  If you have any questions or concerns about this consultation, please do not hesitate to call.  I spent approximately 45 minutes today with the patient and 80% time was used for counseling.      Sincerely,        Corina Ratliff MD, PhD  Department of Radiation Oncology   [facility]  Tel: [phone]  Page: [phone]    [address]  [place] [address]     [facility]   [address] [place] [address]    CC:  Patient Care Team:  Remy Robert MD as PCP - General (Family Medicine)  Jaime Lopez MD as Physician (Oncology)  Corina Ratliff MD as Physician (Radiation Oncology)

## 2021-06-05 NOTE — PROCEDURES
Procedures  Clinical Treatment Planning Note    This is a 64-year-old male with diagnosis of Stage IV lung cancer with clinically symptomatic pelvic metastases.  The palliative radiation therapy is recommended to the patient. The patient is simulated today in the supine position with head rest and under knee cushion to help keep the same position during the daily radiation therapy. 2-3 field will be used to set up radiation therapy fields to include the gross tumor in the left pelvic and the right SI joint region.  I plan to give him radiation therapy at 300 cGy each fraction to a total of 3000 cGy in 10 treatments.      Corina Ratliff MD, PhD  Department of Radiation Oncology   [facility]  Tel: [phone]  Page: [phone]    [facility]  [address] [place] [address]     [place] [id number] [facility] [place] [address]

## 2021-06-05 NOTE — PROCEDURES
Procedures  SIMULATION NOTE:    DIAGNOSIS:  Stage IV non-small cell lung cancer with clinically symptomatic pelvic bone metastasis.      INDICATION:  Palliative radiation therapy is recommended to patient for local control and symptomatic relief.      CONSENT:  The possible risks and side effects of radiation therapy have been discussed with the patient in detail and at great length.  Questions were answered to patient's satisfaction.  Written consent was obtained.      SIMULATION:  The patient is in the supine position with a headrest and under knee cushion to help keep same position during daily radiation therapy.  Tentative isocenter is set up in the center of the pelvic region.  We will acquire CT information to help us better locate the target and design the radiation therapy field.      BLOCKS:  Custom blocks will be drawn to minimize radiation to normal tissues and to protect normal organs, including, but not limited to, small bowels, urinary bladder, rectum, spinal cord, bone and soft tissue.      DOSAGE:  I plan to give him radiation therapy at 300 cGy each fraction to a total of 3000 cGy in 10 treatments targeted to the pelvic region only.      Corina Ratliff MD, PhD  Department of Radiation Oncology   [facility]  Tel: [phone]  Page: [phone]    [facility]  [address] [place] [address]     [place] [id number] [facility] [place] [address]

## 2021-06-06 NOTE — PROGRESS NOTES
Area Treated: Pelvis  Treatment 3 of 10  Total Dose: 900cGy  Next Treatment: Wednesday, February 12 @ 1:00 PM

## 2021-06-06 NOTE — PROGRESS NOTES
RADIATION ONCOLOGY WEEKLY TREATMENT VISIT NOTE      Assessment / Impression       1. Bone metastasis (H)       Tolerating radiation therapy well.  His pain is stable.  All questions and concerns addressed.    Plan:     Continue radiation treatment as prescribed.    Subjective:      HPI: Brayan Jackson is a 64 y.o. male with    1. Bone metastasis (H)         The following portions of the patient's history were reviewed and updated as appropriate: allergies, current medications, past family history, past medical history, past social history, past surgical history and problem list.    Assessment                  Body Site: Bone Site: pelvic  Stereotactic Radiosurgery: No  Concurrent Therapy: No  Today's Dose: 600  Total Dose for Bone: 3000  Today's Fraction/Total Fraction Bone: 2/10  Neuropathy - motor: 3: Objective weakness interfering with activities of daily living                                            Sexuality Alteration                 Emotional Alteration Copin: Effective  Comfort Alteration KPS: 30% Severely disabled, hospitalization indicated  Fatigue (ONS scale) : 6: Moderate Fatigue  Pain Location: pelvis  Pain Intensity. Rate degree of pain ranging from 0 (no pain) to 10 (severe pain) : 5  Pain Description: Ache - Muscular type ache  Pain Intervention: 3: Opiods  Effectiveness of pain intervention: 2: Pain relieved 50%   Nutrition Alteration Anorexia: 1: Loss of appetite  Skin Alteration Skin Sensation: 0: No problem  Skin Reaction: 0: None  AUA Assessment                                  Accompanied by       Objective:     Exam: Examination reviewed no significant changes.    There were no vitals filed for this visit.    Wt Readings from Last 8 Encounters:   20 158 lb 12.8 oz (72 kg)   20 157 lb 14.4 oz (71.6 kg)   19 172 lb (78 kg)   18 150 lb (68 kg)   17 143 lb 14.4 oz (65.3 kg)   17 156 lb 12.8 oz (71.1 kg)   17 140 lb (63.5 kg)    11/22/16 139 lb 9.6 oz (63.3 kg)       General: Alert and oriented, in no acute distress  Brayan has no Erythema.  Aria chart and setup information reviewed    Corina Ratliff MD

## 2021-06-06 NOTE — PROGRESS NOTES
RADIATION ONCOLOGY WEEKLY TREATMENT VISIT NOTE      Assessment / Impression       1. Bone metastasis (H)     2. Malignant neoplasm metastatic to lung, unspecified laterality (H)       Tolerating radiation therapy well.  All questions and concerns addressed.    Plan:     Continue radiation treatment as prescribed.    Patient experienced moderate pain improvement since starting the therapy.    Subjective:      HPI: Brayan Jackson is a 64 y.o. male with    1. Bone metastasis (H)     2. Malignant neoplasm metastatic to lung, unspecified laterality (H)         The following portions of the patient's history were reviewed and updated as appropriate: allergies, current medications, past family history, past medical history, past social history, past surgical history and problem list.    Assessment                  Body Site: Bone Site: pelvic  Stereotactic Radiosurgery: No  Concurrent Therapy: No  Today's Dose: 1800  Total Dose for Bone: 3000  Today's Fraction/Total Fraction Bone: 6/10  Neuropathy - motor: 3: Objective weakness interfering with activities of daily living  Constipation: 1: Requiring stool softner or dietary modifation  Diarrhea W/O Colostomy: 0: None                                            Sexuality Alteration                 Emotional Alteration Copin: Effective  Comfort Alteration KPS: 50% Requirws considerable assistance and frequent medical care  Fatigue (ONS scale) : 5: Moderate Fatigue  Pain Location: pelvis  Pain Intensity. Rate degree of pain ranging from 0 (no pain) to 10 (severe pain) : 5  Pain Description: Ache - Muscular type ache  Pain Intervention: 3: Opiods  Effectiveness of pain intervention: 2: Pain relieved 50%   Nutrition Alteration Anorexia: 1: Loss of appetite  Nausea: 0: None  Vomitin: None  Skin Alteration Skin Sensation: 0: No problem  Skin Reaction: 0: None  AUA Assessment                                  Accompanied by       Objective:     Exam: Examination  reviewed no significant changes.    There were no vitals filed for this visit.    Wt Readings from Last 8 Encounters:   02/17/20 167 lb 8 oz (76 kg)   01/20/20 157 lb 14.4 oz (71.6 kg)   04/28/19 172 lb (78 kg)   11/24/18 150 lb (68 kg)   06/27/17 143 lb 14.4 oz (65.3 kg)   06/16/17 156 lb 12.8 oz (71.1 kg)   01/26/17 140 lb (63.5 kg)   11/22/16 139 lb 9.6 oz (63.3 kg)       General: Alert and oriented, in no acute distress  Brayan has no Erythema.  Aria chart and setup information reviewed    Corina Ratliff MD

## 2021-06-06 NOTE — PROGRESS NOTES
Area Treated: Pelvis  Treatment 5 of 10  Total Dose: 1500cGy  Next Treatment: Friday, February 14th @ 1:00PM

## 2021-06-06 NOTE — PROGRESS NOTES
Area Treated: Pelvis  Treatment 9 of 10  Total Dose: 2700cGy  Next Treatment: Thursday, February 20 @ 1:00PM

## 2021-06-06 NOTE — PROGRESS NOTES
Area Treated: Pelvis  Treatment 6 of 10  Total Dose: 1800cGy  Next Treatment: Monday, February 17 @ 1:00PM

## 2021-06-06 NOTE — TELEPHONE ENCOUNTER
Called pt at this time for a routine post radiation follow up call. I had to leave a message; told pt there is no need to call us back if they are feeling fine, but if they do have any questions or concerns to please call. Nurse line provided.